# Patient Record
Sex: MALE | URBAN - METROPOLITAN AREA
[De-identification: names, ages, dates, MRNs, and addresses within clinical notes are randomized per-mention and may not be internally consistent; named-entity substitution may affect disease eponyms.]

---

## 2022-09-09 ENCOUNTER — NURSE TRIAGE (OUTPATIENT)
Dept: NURSING | Facility: CLINIC | Age: 42
End: 2022-09-09

## 2022-09-09 NOTE — TELEPHONE ENCOUNTER
Nurse Triage SBAR    Is this a 2nd Level Triage? NO    Situation: Patient calling after hang nail on left pinky toe got snagged and started bleeding.  Consent: not needed    Background: Patient noticed a hang nail on left pinky toe a couple days ago    Assessment:   Toenail - ingrown toenail - pinky toe on left side  snagged it and it started bleeding  Painful when touched  Redness at site  No redness of skin around toe  No swelling  No fever  No pus      Protocol Recommended Disposition:   Home care    Recommendation: Advised patient to do home care. Home care reviewed.   Patient verbalized understanding and agreed with plan.     Lilli Perez RN [facility] 9/9/2022 11:42 AM    Reason for Disposition    Minor toe injury    Additional Information    Negative: Major bleeding (actively dripping or spurting) that can't be stopped    Negative: Amputation of toe    Negative: Sounds like a life-threatening emergency to the triager    Negative: Looks infected (e.g., spreading redness, red streak, pus)    Negative: Injury interferes with work or school    Negative: Injury and pain has not improved after 3 days    Negative: Injury is still painful or swollen after 2 weeks    Negative: Diabetes mellitus and small cut (scratch) or abrasion (scrape)    Negative: Last tetanus shot > 5 years ago and DIRTY cut or scrape    Negative: Last tetanus shot > 10 years ago and CLEAN cut or scrape    Negative: Bad limp or can't wear shoes/sandals    Negative: Patient wants to be seen    Negative: SEVERE pain (e.g., excruciating)    Negative: MODERATE-SEVERE pain and blood present under the nail (usually > 50% of nail bed)    Negative: No prior tetanus shots (or is not fully vaccinated) and any wound (e.g., cut or scrape)    Negative: HIV positive or severe immunodeficiency (severely weak immune system) and DIRTY cut or scrape    Negative: Looks infected (e.g., spreading redness, red streak, pus)    Negative: Toenail is  completely torn off    Negative: Base of toenail has popped out from under skin fold    Negative: Looks like a broken bone or dislocated joint (e.g., crooked or deformed)    Negative: Skin is split open or gaping (length > 1/2 inch or 12 mm)    Negative: Bleeding won't stop after 10 minutes of direct pressure (using correct technique)    Negative: Dirt in the wound and not removed after 15 minutes of scrubbing    Negative: Sounds like a serious injury to the triager    Negative: High pressure injection injury (e.g., from paint gun, usually work-related    Protocols used: TOE INJURY-A-OH